# Patient Record
Sex: MALE
[De-identification: names, ages, dates, MRNs, and addresses within clinical notes are randomized per-mention and may not be internally consistent; named-entity substitution may affect disease eponyms.]

---

## 2019-03-21 ENCOUNTER — TELEPHONE (OUTPATIENT)
Dept: SCHEDULING | Age: 54
End: 2019-03-21

## 2021-09-08 ENCOUNTER — HOSPITAL ENCOUNTER (OUTPATIENT)
Age: 56
Discharge: HOME | End: 2021-09-08
Payer: COMMERCIAL

## 2021-09-08 VITALS
OXYGEN SATURATION: 98 % | DIASTOLIC BLOOD PRESSURE: 97 MMHG | BODY MASS INDEX: 27.09 KG/M2 | HEIGHT: 72 IN | SYSTOLIC BLOOD PRESSURE: 158 MMHG | HEART RATE: 76 BPM | WEIGHT: 200 LBS | TEMPERATURE: 98 F | RESPIRATION RATE: 16 BRPM

## 2021-09-08 DIAGNOSIS — Z91.89 AT INCREASED RISK OF EXPOSURE TO COVID-19 VIRUS: Primary | ICD-10-CM

## 2021-09-08 DIAGNOSIS — Z20.822 LAB TEST NEGATIVE FOR COVID-19 VIRUS: ICD-10-CM

## 2021-09-08 LAB — SARS-COV-2 RNA RESP QL NAA+PROBE: NOT DETECTED

## 2021-09-08 PROCEDURE — 99202 OFFICE O/P NEW SF 15 MIN: CPT | Performed by: NURSE PRACTITIONER

## 2021-09-08 PROCEDURE — U0002 COVID-19 LAB TEST NON-CDC: HCPCS | Performed by: NURSE PRACTITIONER

## 2021-09-08 RX ORDER — LOSARTAN POTASSIUM 50 MG/1
50 TABLET ORAL DAILY
COMMUNITY
Start: 2021-03-03

## 2021-09-08 RX ORDER — CARVEDILOL 12.5 MG/1
12.5 TABLET ORAL 2 TIMES DAILY WITH MEALS
COMMUNITY
Start: 2021-03-03

## 2021-09-08 RX ORDER — ASPIRIN 81 MG/1
81 TABLET ORAL DAILY
COMMUNITY

## 2021-09-08 NOTE — ED INITIAL ASSESSMENT (HPI)
Pt here for covid testing. States last exposure 9/5/21,  Denies any symptoms. Awake/alert. Breathing easy and even without distress. Speech clear. Skin warm, dry and pink.

## 2021-09-10 NOTE — ED PROVIDER NOTES
atient Seen in: [facility]    History   Patient presents with:  Testing    Stated Complaint: Testing    HPI    Annis Eisenmenger is a 49year old male who presents to immediate care requesting COVID-19 test.  Family was exposed to Covid on 9/ kg   SpO2 98%   BMI 27.12 kg/m²     PULSE OX within normal limits on room air as interpreted by this provider. Constitutional: Well-developed, well-nourished, no acute distress. Well-hydrated. Appears nontoxic. Patient smiling and playful.   Head: Normo of exposure to COVID-19 virus  (primary encounter diagnosis)  Lab test negative for COVID-19 virus    Disposition:  Discharge    Follow-up:  No follow-up provider specified.     Medications Prescribed:  Discharge Medication List as of 9/8/2021  6:55 PM

## 2022-11-02 ENCOUNTER — APPOINTMENT (OUTPATIENT)
Dept: GENERAL RADIOLOGY | Age: 57
End: 2022-11-02
Attending: NURSE PRACTITIONER
Payer: COMMERCIAL

## 2022-11-02 ENCOUNTER — HOSPITAL ENCOUNTER (OUTPATIENT)
Age: 57
Discharge: HOME | End: 2022-11-02
Payer: COMMERCIAL

## 2022-11-02 VITALS
HEIGHT: 72 IN | SYSTOLIC BLOOD PRESSURE: 145 MMHG | WEIGHT: 200 LBS | DIASTOLIC BLOOD PRESSURE: 90 MMHG | BODY MASS INDEX: 27.09 KG/M2 | TEMPERATURE: 98 F | HEART RATE: 80 BPM | OXYGEN SATURATION: 99 % | RESPIRATION RATE: 20 BRPM

## 2022-11-02 DIAGNOSIS — R05.1 ACUTE COUGH: Primary | ICD-10-CM

## 2022-11-02 DIAGNOSIS — J44.1 COPD EXACERBATION (HCC): ICD-10-CM

## 2022-11-02 PROCEDURE — 71046 X-RAY EXAM CHEST 2 VIEWS: CPT | Performed by: NURSE PRACTITIONER

## 2022-11-02 PROCEDURE — 99214 OFFICE O/P EST MOD 30 MIN: CPT | Performed by: NURSE PRACTITIONER

## 2022-11-02 RX ORDER — BENZONATATE 100 MG/1
200 CAPSULE ORAL 3 TIMES DAILY PRN
Qty: 30 CAPSULE | Refills: 0 | Status: SHIPPED | OUTPATIENT
Start: 2022-11-02 | End: 2022-12-02

## 2022-11-02 RX ORDER — PREDNISONE 20 MG/1
40 TABLET ORAL DAILY
Qty: 10 TABLET | Refills: 0 | Status: SHIPPED | OUTPATIENT
Start: 2022-11-02 | End: 2022-11-07

## 2022-11-02 RX ORDER — ALBUTEROL SULFATE 90 UG/1
2 AEROSOL, METERED RESPIRATORY (INHALATION) EVERY 4 HOURS PRN
Qty: 1 EACH | Refills: 0 | Status: SHIPPED | OUTPATIENT
Start: 2022-11-02 | End: 2022-12-02

## 2022-11-02 NOTE — DISCHARGE INSTRUCTIONS
Go to ER for chest pain or shortness of breath  Tylenol for discomfort  Use the medicaitons as directed  Follow up with dr Etienne Escobedo as planned next week

## 2022-11-02 NOTE — ED INITIAL ASSESSMENT (HPI)
Patient comes in due to a lingering cough after having a cold that started 10/24. Patient is coughing every once in a while bringing up greenish phelgm, pt has had pneumonia in the past and would like to have someone listen to his lungs and determine if a chest xray is needed.  Patient tested at home for covid was negative 10/29